# Patient Record
Sex: FEMALE | Race: WHITE | NOT HISPANIC OR LATINO | Employment: PART TIME | URBAN - METROPOLITAN AREA
[De-identification: names, ages, dates, MRNs, and addresses within clinical notes are randomized per-mention and may not be internally consistent; named-entity substitution may affect disease eponyms.]

---

## 2021-01-16 PROCEDURE — 99285 EMERGENCY DEPT VISIT HI MDM: CPT

## 2021-01-16 PROCEDURE — 99284 EMERGENCY DEPT VISIT MOD MDM: CPT | Performed by: EMERGENCY MEDICINE

## 2021-01-17 ENCOUNTER — HOSPITAL ENCOUNTER (EMERGENCY)
Facility: HOSPITAL | Age: 26
Discharge: HOME | End: 2021-01-17
Attending: EMERGENCY MEDICINE | Admitting: EMERGENCY MEDICINE
Payer: COMMERCIAL

## 2021-01-17 ENCOUNTER — APPOINTMENT (EMERGENCY)
Dept: RADIOLOGY | Facility: HOSPITAL | Age: 26
End: 2021-01-17
Payer: COMMERCIAL

## 2021-01-17 VITALS
TEMPERATURE: 98.1 F | HEART RATE: 68 BPM | DIASTOLIC BLOOD PRESSURE: 69 MMHG | HEIGHT: 63 IN | SYSTOLIC BLOOD PRESSURE: 118 MMHG | RESPIRATION RATE: 18 BRPM | WEIGHT: 269.18 LBS | OXYGEN SATURATION: 98 % | BODY MASS INDEX: 47.7 KG/M2

## 2021-01-17 DIAGNOSIS — R07.9 CHEST PAIN: Primary | ICD-10-CM

## 2021-01-17 LAB
ATRIAL RATE: 75 BPM
B-HCG SERPL-ACNC: <2 MIU/ML
P AXIS: 59 DEGREES
PR INTERVAL: 146 MS
QRS AXIS: 41 DEGREES
QRSD INTERVAL: 94 MS
QT INTERVAL: 374 MS
QTC INTERVAL: 404 MS
T WAVE AXIS: 49 DEGREES
TROPONIN I SERPL-MCNC: <0.02 NG/ML
VENTRICULAR RATE: 70 BPM

## 2021-01-17 PROCEDURE — 71046 X-RAY EXAM CHEST 2 VIEWS: CPT

## 2021-01-17 PROCEDURE — 84702 CHORIONIC GONADOTROPIN TEST: CPT | Performed by: EMERGENCY MEDICINE

## 2021-01-17 PROCEDURE — 36415 COLL VENOUS BLD VENIPUNCTURE: CPT | Performed by: EMERGENCY MEDICINE

## 2021-01-17 PROCEDURE — 84484 ASSAY OF TROPONIN QUANT: CPT | Performed by: EMERGENCY MEDICINE

## 2021-01-17 PROCEDURE — 96374 THER/PROPH/DIAG INJ IV PUSH: CPT

## 2021-01-17 PROCEDURE — 93010 ELECTROCARDIOGRAM REPORT: CPT | Performed by: INTERNAL MEDICINE

## 2021-01-17 PROCEDURE — 93005 ELECTROCARDIOGRAM TRACING: CPT

## 2021-01-17 RX ORDER — ACETAMINOPHEN 325 MG/1
975 TABLET ORAL ONCE
Status: COMPLETED | OUTPATIENT
Start: 2021-01-17 | End: 2021-01-17

## 2021-01-17 RX ORDER — IBUPROFEN 400 MG/1
400 TABLET ORAL EVERY 8 HOURS
Qty: 30 TABLET | Refills: 0 | Status: SHIPPED | OUTPATIENT
Start: 2021-01-17 | End: 2021-01-27

## 2021-01-17 RX ORDER — KETOROLAC TROMETHAMINE 30 MG/ML
15 INJECTION, SOLUTION INTRAMUSCULAR; INTRAVENOUS ONCE
Status: COMPLETED | OUTPATIENT
Start: 2021-01-17 | End: 2021-01-17

## 2021-01-17 RX ORDER — ACETAMINOPHEN 325 MG/1
650 TABLET ORAL EVERY 6 HOURS PRN
Qty: 30 TABLET | Refills: 0 | Status: SHIPPED | OUTPATIENT
Start: 2021-01-17 | End: 2021-01-22

## 2021-01-17 RX ORDER — OMEPRAZOLE 20 MG/1
20 CAPSULE, DELAYED RELEASE ORAL DAILY
Qty: 21 CAPSULE | Refills: 0 | Status: SHIPPED | OUTPATIENT
Start: 2021-01-17 | End: 2021-02-07

## 2021-01-17 RX ADMIN — DICLOFENAC SODIUM 2 G: 10 GEL TOPICAL at 00:46

## 2021-01-17 RX ADMIN — ACETAMINOPHEN 975 MG: 325 TABLET, FILM COATED ORAL at 00:46

## 2021-01-17 RX ADMIN — KETOROLAC TROMETHAMINE 15 MG: 30 INJECTION, SOLUTION INTRAMUSCULAR at 01:31

## 2021-01-17 NOTE — ED PROVIDER NOTES
History  Chief Complaint   Patient presents with    Chest Pain     RIGHT SIDED CHEST PAIN, STARTING 2 DAYS AGO, THOUGH IT WAS GAS, BUT HURTS MORE WITH MOVEMENT          HPI       Patient is a pleasant 30 yof who presents with pain in the mid chest      No f/c/s  No vomiting  No radiation to the back  No tearing pain going to the back  Normal bilat UE pulses  No pleuritic pain  No history of PE  No new unilateral leg swelling  Non exertional  No sweats  No right sided pain  No vomiting  No fever or cough to suggest pneumonia  No  vomiting or subcue crepitus  Equal breath sounds  No skin rash  No new murmur to suggest symptomatic valvular stenosis/ regurg or ruptured chordae  Vitals do not suggest tamponade  No palpable crepitus on exam    No recent viral syndromes to suggest Yolanda/Myocarditis      PE risk, Wells score = [0]   (0) clinically suspected DVT - 3 points   (0) alternative diagnosis is less likely than PE - 3 0 points   (0) tachycardia - 1 5 points   (0) immobilization/surgery in previous four weeks - 1 5 points   (0) history of DVT or PE - 1 5 points   (0) hemoptysis - 1 0 points   (0) malignancy (treatment for within 6 months, palliative) - 1 0 points     The patient is less than 50, has an initial HR < 100, O2 Saturation >95%, no hx of previous VTE, no recent trauma or surgery within 4 weeks, no hemoptylsis, and is not on any exogenous estrogen  The patient has no need for further workup of PE and has  <2% chance of PE  My initial pre-test probability of PE is <15% and PERC Rule criteria are satisfied  Medical decision making:  Pleasant, well-appearing 47-year-old female, lungs clear a chest x-ray, troponin negative, EKG without any ischemic findings, clear for for PE evaluation by Wells criteria and PERC score  Suspect that patient may have either pericarditis, although no EKG changes, versus pleurisy versus costochondritis    She does note that she feels tender right in the mid chest   Nonetheless, will still have her follow-up with Cardiology given some continued pain  Discussed plan with patient, she wants to go home and follow-up  None       Past Medical History:   Diagnosis Date    Asthma        History reviewed  No pertinent surgical history  History reviewed  No pertinent family history  I have reviewed and agree with the history as documented  E-Cigarette/Vaping    E-Cigarette Use Never User      E-Cigarette/Vaping Substances     Social History     Tobacco Use    Smoking status: Current Some Day Smoker     Packs/day: 0 25     Types: Cigarettes    Smokeless tobacco: Never Used   Substance Use Topics    Alcohol use: Not Currently    Drug use: Not Currently       Review of Systems   Respiratory: Negative for chest tightness and shortness of breath  Cardiovascular: Positive for chest pain  All other systems reviewed and are negative  Physical Exam  Physical Exam  Vitals signs and nursing note reviewed  Constitutional:       Appearance: She is well-developed  HENT:      Head: Normocephalic and atraumatic  Right Ear: External ear normal       Left Ear: External ear normal    Eyes:      Conjunctiva/sclera: Conjunctivae normal    Neck:      Musculoskeletal: Normal range of motion and neck supple  Vascular: No JVD  Trachea: No tracheal deviation  Cardiovascular:      Rate and Rhythm: Normal rate and regular rhythm  Heart sounds: Normal heart sounds  Pulmonary:      Effort: Pulmonary effort is normal  No respiratory distress  Breath sounds: No wheezing or rales  Abdominal:      Palpations: Abdomen is soft  Tenderness: There is no abdominal tenderness  There is no guarding or rebound  Musculoskeletal:         General: No tenderness  Skin:     General: Skin is warm and dry  Findings: No erythema or rash  Neurological:      General: No focal deficit present        Mental Status: She is alert and oriented to person, place, and time  Motor: No weakness  Psychiatric:         Behavior: Behavior normal          Thought Content:  Thought content normal          Vital Signs  ED Triage Vitals [01/17/21 0002]   Temperature Pulse Respirations Blood Pressure SpO2   98 1 °F (36 7 °C) 77 18 120/73 99 %      Temp Source Heart Rate Source Patient Position - Orthostatic VS BP Location FiO2 (%)   Oral Monitor Lying Right arm --      Pain Score       4           Vitals:    01/17/21 0002 01/17/21 0200 01/17/21 0300   BP: 120/73 119/66 118/69   Pulse: 77 80 68   Patient Position - Orthostatic VS: Lying Lying Lying         Visual Acuity      ED Medications  Medications   acetaminophen (TYLENOL) tablet 975 mg (975 mg Oral Given 1/17/21 0046)   ketorolac (TORADOL) injection 15 mg (15 mg Intravenous Given 1/17/21 0131)       Diagnostic Studies  Results Reviewed     Procedure Component Value Units Date/Time    hCG, quantitative [[id number]]  (Normal) Collected: 01/17/21 0020    Lab Status: Final result Specimen: Blood from Arm, Right Updated: 01/17/21 0107     HCG, Quant <2 mIU/mL     Narrative:       Expected Ranges:     Approximate               Approximate HCG  Gestation age          Concentration ( mIU/mL)  _____________          ______________________   [place]                      HCG values  0 2-1                       5-50  1-2                           2-3                         100-5000  3-4                         500-08177  4-5                         1000-67592  5-6                         62398-192730  6-8                         19454-176877  8-12                        22087-925734      Troponin I [[id number]]  (Normal) Collected: 01/17/21 0020    Lab Status: Final result Specimen: Blood from Arm, Right Updated: 01/17/21 0051     Troponin I <0 02 ng/mL                  XR chest 2 views    (Results Pending)              Procedures  Procedures         ED Course                             SBIRT 20yo+      Most Recent Value   SBIRT (22 yo +)   In order to provide better care to our patients, we are screening all of our patients for alcohol and drug use  Would it be okay to ask you these screening questions? Yes Filed at: 01/17/2021 0051   Initial Alcohol Screen: US AUDIT-C    1  How often do you have a drink containing alcohol? 1 Filed at: 01/17/2021 0051   2  How many drinks containing alcohol do you have on a typical day you are drinking? 0 Filed at: 01/17/2021 0051   3a  Male UNDER 65: How often do you have five or more drinks on one occasion? 0 Filed at: 01/17/2021 0051   3b  FEMALE Any Age, or MALE 65+: How often do you have 4 or more drinks on one occassion? 0 Filed at: 01/17/2021 0051   Audit-C Score  1 Filed at: 01/17/2021 1462   SUAD: How many times in the past year have you    Used an illegal drug or used a prescription medication for non-medical reasons? Never Filed at: 01/17/2021 0051                    MDM    Disposition  Final diagnoses:   Chest pain     Time reflects when diagnosis was documented in both MDM as applicable and the Disposition within this note     Time User Action Codes Description Comment    1/17/2021  2:23 AM Karen MCMANUS Add [R07 9] Chest pain       ED Disposition     ED Disposition Condition Date/Time Comment    Discharge Stable Sun Jan 17, 2021  2:23 AM Rufus See discharge to home/self care              Follow-up Information     Follow up With Specialties Details Why Contact Info Additional Information    Haley Pate MD  In 1 day  [facility]  [address] Cardiology In 2 days  [address] [facility] [address] SOUTH Cardiology [address] [facility], [address] [place], [place], [address] [place] [address]          Discharge Medication List as of 1/17/2021  2:26 AM      START taking these medications    Details   acetaminophen (TYLENOL) 325 mg tablet Take 2 tablets (650 mg total) by mouth every 6 (six) hours as needed for mild pain for up to 5 days, Starting Sun 1/17/2021, Until Fri 1/22/2021, Print      ibuprofen (MOTRIN) 400 mg tablet Take 1 tablet (400 mg total) by mouth every 8 (eight) hours for 10 days, Starting Sun 1/17/2021, Until Wed 1/27/2021, Print      omeprazole (PriLOSEC) 20 mg delayed release capsule Take 1 capsule (20 mg total) by mouth daily for 21 days, Starting Sun 1/17/2021, Until Sun 2/7/2021, Print           No discharge procedures on file      PDMP Review     None          ED Provider  Electronically Signed by           Eran Jackman MD  01/17/21 8705

## 2023-05-25 ENCOUNTER — HOSPITAL ENCOUNTER (EMERGENCY)
Facility: HOSPITAL | Age: 28
Discharge: HOME | End: 2023-05-25
Attending: EMERGENCY MEDICINE

## 2023-05-25 VITALS
TEMPERATURE: 98 F | RESPIRATION RATE: 20 BRPM | DIASTOLIC BLOOD PRESSURE: 94 MMHG | OXYGEN SATURATION: 97 % | SYSTOLIC BLOOD PRESSURE: 170 MMHG | HEART RATE: 89 BPM

## 2023-05-25 DIAGNOSIS — L50.9 URTICARIA: Primary | ICD-10-CM

## 2023-05-25 RX ORDER — FAMOTIDINE 20 MG/1
20 TABLET, FILM COATED ORAL 2 TIMES DAILY
Qty: 8 TABLET | Refills: 0 | Status: SHIPPED | OUTPATIENT
Start: 2023-05-25 | End: 2023-05-29

## 2023-05-25 RX ORDER — PREDNISONE 20 MG/1
60 TABLET ORAL DAILY
Qty: 12 TABLET | Refills: 0 | Status: SHIPPED | OUTPATIENT
Start: 2023-05-26 | End: 2023-05-30

## 2023-05-25 RX ORDER — PREDNISONE 20 MG/1
60 TABLET ORAL ONCE
Status: COMPLETED | OUTPATIENT
Start: 2023-05-25 | End: 2023-05-25

## 2023-05-25 RX ORDER — HYDROXYZINE HYDROCHLORIDE 25 MG/1
25 TABLET, FILM COATED ORAL ONCE
Status: COMPLETED | OUTPATIENT
Start: 2023-05-25 | End: 2023-05-25

## 2023-05-25 RX ORDER — HYDROXYZINE HYDROCHLORIDE 25 MG/1
25 TABLET, FILM COATED ORAL EVERY 6 HOURS
Qty: 16 TABLET | Refills: 0 | Status: SHIPPED | OUTPATIENT
Start: 2023-05-25 | End: 2023-05-29

## 2023-05-25 RX ORDER — FAMOTIDINE 20 MG/1
20 TABLET, FILM COATED ORAL ONCE
Status: COMPLETED | OUTPATIENT
Start: 2023-05-25 | End: 2023-05-25

## 2023-05-25 RX ADMIN — FAMOTIDINE 20 MG: 20 TABLET ORAL at 01:35

## 2023-05-25 RX ADMIN — PREDNISONE 60 MG: 20 TABLET ORAL at 01:35

## 2023-05-25 RX ADMIN — HYDROXYZINE HYDROCHLORIDE 25 MG: 25 TABLET ORAL at 01:35
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
